# Patient Record
Sex: MALE | Race: WHITE | NOT HISPANIC OR LATINO | Employment: UNEMPLOYED | URBAN - NONMETROPOLITAN AREA
[De-identification: names, ages, dates, MRNs, and addresses within clinical notes are randomized per-mention and may not be internally consistent; named-entity substitution may affect disease eponyms.]

---

## 2021-09-23 ENCOUNTER — HOSPITAL ENCOUNTER (OUTPATIENT)
Facility: MEDICAL CENTER | Age: 46
End: 2021-09-23
Attending: PHYSICIAN ASSISTANT
Payer: MEDICARE

## 2021-09-23 ENCOUNTER — OFFICE VISIT (OUTPATIENT)
Dept: URGENT CARE | Facility: PHYSICIAN GROUP | Age: 46
End: 2021-09-23
Payer: OTHER GOVERNMENT

## 2021-09-23 VITALS
DIASTOLIC BLOOD PRESSURE: 66 MMHG | HEIGHT: 71 IN | OXYGEN SATURATION: 98 % | HEART RATE: 66 BPM | RESPIRATION RATE: 16 BRPM | BODY MASS INDEX: 24.36 KG/M2 | SYSTOLIC BLOOD PRESSURE: 96 MMHG | TEMPERATURE: 98.4 F | WEIGHT: 174 LBS

## 2021-09-23 DIAGNOSIS — J98.8 VIRAL RESPIRATORY ILLNESS: ICD-10-CM

## 2021-09-23 DIAGNOSIS — F17.200 SMOKER: ICD-10-CM

## 2021-09-23 DIAGNOSIS — R07.2 PRECORDIAL PAIN: ICD-10-CM

## 2021-09-23 DIAGNOSIS — B97.89 VIRAL RESPIRATORY ILLNESS: ICD-10-CM

## 2021-09-23 DIAGNOSIS — R05.9 COUGH: ICD-10-CM

## 2021-09-23 LAB — COVID ORDER STATUS COVID19: NORMAL

## 2021-09-23 PROCEDURE — 93000 ELECTROCARDIOGRAM COMPLETE: CPT | Performed by: PHYSICIAN ASSISTANT

## 2021-09-23 PROCEDURE — U0005 INFEC AGEN DETEC AMPLI PROBE: HCPCS

## 2021-09-23 PROCEDURE — 99204 OFFICE O/P NEW MOD 45 MIN: CPT | Mod: CS | Performed by: PHYSICIAN ASSISTANT

## 2021-09-23 PROCEDURE — U0003 INFECTIOUS AGENT DETECTION BY NUCLEIC ACID (DNA OR RNA); SEVERE ACUTE RESPIRATORY SYNDROME CORONAVIRUS 2 (SARS-COV-2) (CORONAVIRUS DISEASE [COVID-19]), AMPLIFIED PROBE TECHNIQUE, MAKING USE OF HIGH THROUGHPUT TECHNOLOGIES AS DESCRIBED BY CMS-2020-01-R: HCPCS

## 2021-09-23 RX ORDER — KETOROLAC TROMETHAMINE 30 MG/ML
30 INJECTION, SOLUTION INTRAMUSCULAR; INTRAVENOUS ONCE
Status: COMPLETED | OUTPATIENT
Start: 2021-09-23 | End: 2021-09-23

## 2021-09-23 RX ORDER — BENZONATATE 200 MG/1
200 CAPSULE ORAL 3 TIMES DAILY PRN
Qty: 60 CAPSULE | Refills: 0 | Status: SHIPPED | OUTPATIENT
Start: 2021-09-23

## 2021-09-23 RX ADMIN — KETOROLAC TROMETHAMINE 30 MG: 30 INJECTION, SOLUTION INTRAMUSCULAR; INTRAVENOUS at 10:30

## 2021-09-23 ASSESSMENT — ENCOUNTER SYMPTOMS
MYALGIAS: 1
COUGH: 1
CHILLS: 1
FEVER: 1

## 2021-09-23 NOTE — PATIENT INSTRUCTIONS
INSTRUCTIONS FOR COVID-19 OR ANY OTHER INFECTIOUS RESPIRATORY ILLNESSES    The Centers for Disease Control and Prevention (CDC) states that early indications for COVID-19 include cough, shortness of breath, difficulty breathing, or at least two of the following symptoms: chills, shaking with chills, muscle pain, headache, sore throat, and loss of taste or smell. Symptoms can range from mild to severe and may appear up to two weeks after exposure to the virus.    The practice of self-isolation and quarantine helps protect the public and your family by  preventing exposure to people who have or may have a contagious disease. Please follow the prevention steps below as based on CDC guidelines:    WHEN TO STOP ISOLATION: Persons with COVID-19 or any other infectious respiratory illness who have symptoms and were advised to care for themselves at home may discontinue home isolation under the following conditions:  · At least 24 hours have passed since recovery defined as resolution of fever without the use of fever-reducing medications; AND,  · Improvement in respiratory symptoms (e.g., cough, shortness of breath); AND,  · At least 10 days have passed since symptoms first appeared and have had no subsequent illness.    MONITOR YOUR SYMPTOMS: If your illness is worsening, seek prompt medical attention. If you have a medical emergency and need to call 911, notify the dispatch personnel that you have, or are being evaluated for confirmed or suspected COVID-19 or another infectious respiratory illness. Wear a facemask if possible.    ACTIVITY RESTRICTION: restrict activities outside your home, except for getting medical care. Do not go to work, school, or public areas. Avoid using public transportation, ride-sharing, or taxis.    SCHEDULED MEDICAL APPOINTMENTS: Notify your provider that you have, or are being evaluated for, confirmed or suspected COVID-19 or another infectious respiratory. This will help the healthcare  provider’s office safely take care of you and keep other people from getting exposed or infected.    FACEMASKS, when to wear: Anytime you are away from your home or around other people or pets. If you are unable to wear one, maintain a minimum of 6 feet distancing from others.    LIVING ENVIRONMENT: Stay in a separate room from other people and pets. If possible, use a separate bathroom, have someone else care for your pets and avoid sharing household items. Any items used should be washed thoroughly with soap and water. Clean all “high-touch” surfaces every day. Use a household cleaning spray or wipe, according to the label instructions. High touch surfaces include (but are not limited to) counters, tabletops, doorknobs, bathroom fixtures, toilets, phones, keyboards, tablets, and bedside tables.     HAND WASHING: Frequently wash hands with soap and water for at least 20 seconds,  especially after blowing your nose, coughing, or sneezing; going to the bathroom; before and after interacting with pets; and before and after eating or preparing food. If hands are visibly dirty use soap and water. If soap and water are not available, use an alcohol-based hand  with at least 60% alcohol. Avoid touching your eyes, nose, and mouth with unwashed hands. Cover your coughs and sneezes with a tissue. Throw used tissues in a lined trash can. Immediately wash your hands.    ACTIVE/FACILITATED SELF-MONITORING: Follow instructions provided by your local health department or health professionals, as appropriate. When working with your local health department check their available hours.    [facility]   Phone Number   [facility] [phone]   [facility] [place] [phone]   [place] [address]   [place] [phone]     IF YOU HAVE CONFIRMED POSITIVE COVID-19:    Those who have completely recovered from COVID-19 may have immune-boosting antibodies in their plasma--called “convalescent plasma”--that could be  used to treat critically ill COVID19 patients.    [facility] is excited to begin working with [facility] on collecting convalescent plasma from  people who have recovered from COVID-19 as part of a program to treat patients infected with the virus. This FDA-approved “emergency investigational new drug” is a special blood product containing antibodies that may give patients an extra boost to fight the virus.    To be eligible to donate convalescent plasma, you must have a prior COVID-19 diagnosis documented by a laboratory test (or a positive test result for SARS-CoV-2 antibodies) and meet additional eligibility requirements.    If you are interested in donating convalescent plasma or have any additional questions, please contact the [facility] Convalescent Plasma  at [phone] or via e-mail at [email or web address].

## 2021-09-23 NOTE — PROGRESS NOTES
"Subjective:     Jef Potts  is a 46 y.o. male who presents for Coronavirus Screening (Body aches, cough, chills, x3days )       HPI  Mr. Potts is a very pleasant 46-year-old gentleman who presents to urgent care with 3-day history of perceived fever, chills, fatigue, generalized body aches, runny nose, congestion, cough.  Patient denies any nausea, vomiting or diarrhea.  Denies shortness of breath.  Patient believes he may have loss of taste and loss of smell.  Patient is not Covid vaccinated.  He has had no known Covid exposure.    Upon direct questioning, patient does mention onset today of precordial chest pain.  Pain does not seem to be changed by breathing or coughing.    Patient does have history of hepatitis B.  He is currently homeless.  He is a current smoker.    Review of Systems   Constitutional: Positive for chills, fever and malaise/fatigue.   HENT: Positive for congestion.    Respiratory: Positive for cough.    Cardiovascular: Positive for chest pain.   Musculoskeletal: Positive for myalgias.   All other systems reviewed and are negative.    No Known Allergies  Past medical history, family history, surgical history and social history are reviewed and updated in the record today.     Objective:   BP (!) 96/66   Pulse 66   Temp 36.9 °C (98.4 °F) (Temporal)   Resp 16   Ht 1.803 m (5' 11\")   Wt 78.9 kg (174 lb)   SpO2 98%   BMI 24.27 kg/m²   Physical Exam  Vitals and nursing note reviewed.   Constitutional:       Appearance: He is well-developed. He is ill-appearing. He is not toxic-appearing.   HENT:      Head: Normocephalic and atraumatic.      Right Ear: Tympanic membrane, ear canal and external ear normal.      Left Ear: Tympanic membrane, ear canal and external ear normal.      Nose: Mucosal edema, congestion and rhinorrhea present.      Right Turbinates: Swollen.      Left Turbinates: Swollen.      Mouth/Throat:      Lips: Pink.      Mouth: Mucous membranes are moist.      Pharynx: Uvula " midline. No oropharyngeal exudate.   Eyes:      Conjunctiva/sclera: Conjunctivae normal.      Pupils: Pupils are equal, round, and reactive to light.   Cardiovascular:      Rate and Rhythm: Normal rate and regular rhythm.      Heart sounds: Normal heart sounds. No murmur heard.   No friction rub.   Pulmonary:      Effort: Pulmonary effort is normal. No respiratory distress.      Breath sounds: Normal breath sounds.   Chest:      Chest wall: No tenderness.   Abdominal:      General: Bowel sounds are normal.      Palpations: Abdomen is soft.      Tenderness: There is no abdominal tenderness.   Musculoskeletal:         General: Normal range of motion.      Cervical back: Normal range of motion and neck supple.   Lymphadenopathy:      Head:      Right side of head: No submental, submandibular or tonsillar adenopathy.      Left side of head: No submental, submandibular or tonsillar adenopathy.      Cervical: No cervical adenopathy.      Upper Body:      Right upper body: No supraclavicular adenopathy.      Left upper body: No supraclavicular adenopathy.   Skin:     General: Skin is warm and dry.      Findings: No rash.   Neurological:      Mental Status: He is alert and oriented to person, place, and time.      Cranial Nerves: Cranial nerves are intact. No cranial nerve deficit.      Sensory: Sensation is intact. No sensory deficit.      Motor: Motor function is intact.      Coordination: Coordination is intact. Coordination normal.      Gait: Gait is intact.   Psychiatric:         Attention and Perception: Attention normal.         Mood and Affect: Mood normal.         Speech: Speech normal.         Behavior: Behavior normal.         Thought Content: Thought content normal.         Judgment: Judgment normal.          Assessment/Plan:   1. Viral respiratory illness  - EKG - Clinic Performed  - SARS-CoV-2 PCR (24 hour In-House): Collect NP swab in [facility]; Future  - ketorolac (TORADOL) injection 30 mg    2. Precordial pain  -  EKG - Clinic Performed  - SARS-CoV-2 PCR (24 hour In-House): Collect NP swab in [facility]; Future    3. Cough  - benzonatate (TESSALON) 200 MG capsule; Take 1 Capsule by mouth 3 times a day as needed.  Dispense: 60 Capsule; Refill: 0    4. Smoker    Less than 3 minutes spent counseling on nicotine dependence, associated disease process and affect on present illness. Counseled regarding cessation. Not ready to quit at this time.     EKG:  Comparison: None available for review  Rhythm: Sinus rhythm  Ectopy: None  Rate: 72  QRS Axis: Borderline right axis deviation  Conduction: Normal  ST segment: No ST segment elevation or depression noted  T waves: No flattening or inversion of T waves identified  Other: RSR prime V1  Clinical impression: Borderline abnormal EKG    Nasal saline rinse  Over-the-counter Flonase nasal spray per 's instructions  Mucinex as needed    Increase fluids, rest.  Patient may use salt water gargles, ice pops, cool fluids.    May use Tylenol or ibuprofen over-the-counter as needed for pain or fever not to exceed recommended daily dose.    EKG with borderline right axis deviation, otherwise within normal limits, no ST segment elevation or depression or flattening or inversion of T waves identified.  Patient is counseled on the importance of presenting to the emergency department immediately for any significant worsening of chest pain.  Patient verbalizes understanding and is agreeable with the plan.      Tessalon as needed cough.  Torodol given in clinic for pain    Differential diagnosis, natural history, supportive care, and indications for immediate follow-up discussed.  Red flag warning symptoms and strict ER/follow-up precautions given.      Upon entering exam room I ensured patient was wearing a mask.  This provider wore appropriate PPE throughout entire visit.  Patient wore mask entire visit except for a brief period while examining oropharynx.    Please note that this note was  created using voice recognition speech to text software. Every effort has been made to correct obvious errors.  However, I expect there are errors of grammar and possibly context that were not discovered prior to finalizing the note  SClark Larson PA-C

## 2021-09-24 LAB
SARS-COV-2 RNA RESP QL NAA+PROBE: DETECTED
SPECIMEN SOURCE: ABNORMAL

## 2021-09-29 ENCOUNTER — TELEPHONE (OUTPATIENT)
Dept: URGENT CARE | Facility: PHYSICIAN GROUP | Age: 46
End: 2021-09-29

## 2021-09-29 NOTE — TELEPHONE ENCOUNTER
----- Message from Joanne Larson P.A.-C. sent at 9/24/2021  4:14 PM PDT -----  Regarding: Covid Positive  Patient not active in [employer], unable to send message.  Please contact patient and notify Covid positive.  Proceed with plan discussed at office visit.  Purnima Larson PA-C